# Patient Record
(demographics unavailable — no encounter records)

---

## 2024-10-09 NOTE — DISCUSSION/SUMMARY
[FreeTextEntry1] : 8 mth with uri saline nose drops and suction, saline nebs follow up if symptoms persist or worsen cerumen removal via curette

## 2024-10-09 NOTE — HISTORY OF PRESENT ILLNESS
[de-identified] : cough [FreeTextEntry6] : cough and rhinorrhea 4-5 days ago, cough 2 days eating and drinking

## 2024-10-31 NOTE — DISCUSSION/SUMMARY
[] : The components of the vaccine(s) to be administered today are listed in the plan of care. The disease(s) for which the vaccine(s) are intended to prevent and the risks have been discussed with the caretaker.  The risks are also included in the appropriate vaccination information statements which have been provided to the patient's caregiver.  The caregiver has given consent to vaccinate. [FreeTextEntry1] : 9 mth well, patricia hypothyroidism, increasing weight, nml development Hep b #3 flu #2 labs when we do thyroid labs sleep discussed to urology for foreskin issues discussed decreasing bottles and increasing meals Increase table foods, 3 meals with 2-3 snacks per day. Incorporate up to 6 oz of fluorinated water daily in a Sippy cup or cup with a straw. Wipe teeth daily with washcloth. When in car, patient should be in rear-facing car seat in back seat. Put baby to sleep in own crib with no loose or soft bedding. Lower crib mattress. Help baby to maintain consistent daily routines and sleep schedule. Recognize stranger anxiety. Ensure home is safe since baby is increasingly mobile. Be within arm's reach of baby at all times. Use consistent, positive discipline. Avoid screen time. Read aloud to baby.

## 2024-10-31 NOTE — HISTORY OF PRESENT ILLNESS
[Mother] : mother [Fruit] : fruit [Vegetables] : vegetables [Meat] : meat [Eggs] : eggs [Fish] : no fish [Peanut] : peanut [Dairy] : dairy [Normal] : Normal [In Crib] : sleeps in crib [Wakes up at night] : wakes up at night [Loose bedding, pillow, toys, and/or bumpers in crib] : no loose bedding, pillow, toys, and/or bumpers in crib [Brushing teeth] : not brushing teeth [de-identified] : 5 bottles/day

## 2024-10-31 NOTE — DEVELOPMENTAL MILESTONES
[Normal Development] : Normal Development [None] : none [Uses basic gestures] : uses basic gestures [Says "Dawson" or "Mama"] : says "Dawson" or "Mama" nonspecifically [Crawls] : crawls [Picks up small objects with 3 fingers] : picks up small objects with 3 fingers and thumb [Yes] : Completed.

## 2024-10-31 NOTE — PHYSICAL EXAM

## 2024-11-19 NOTE — DISCUSSION/SUMMARY
[FreeTextEntry1] : 9 mth with RSV bronchiolitis, rhino/enterovirus cool mist humidifier saline via nebulizer left serous OM, observe, follow up if worsens

## 2024-11-19 NOTE — PHYSICAL EXAM
[Clear] : right tympanic membrane clear [Clear Effusion] : clear effusion [Mucoid Discharge] : mucoid discharge [NL] : warm, clear

## 2024-11-19 NOTE — HISTORY OF PRESENT ILLNESS
[de-identified] : rsv check [FreeTextEntry6] : cough 6 days, nasal congestion resolved fever s/p 101 poor appetite, drinking not using albuterol

## 2024-12-03 NOTE — HISTORY OF PRESENT ILLNESS
[de-identified] : diarrhea [FreeTextEntry6] : diarrhea multiple episodes of watery stool 5 days ago, now has improved, no vomiting or fever feeding well, 3 bottles, 3 meals

## 2024-12-03 NOTE — DISCUSSION/SUMMARY
[FreeTextEntry1] : 10 mth with hypothyroid, gaining weight improving diarrhea, discussed diet labs drawn follow up if symptoms persist or worsen

## 2024-12-17 NOTE — DISCUSSION/SUMMARY
[FreeTextEntry1] : 10 mth with hypothyroidism, anemia, s/p synthroid increase, appears well labs drawn

## 2025-01-03 NOTE — HISTORY OF PRESENT ILLNESS
[FreeTextEntry6] : vomited a few times over last few days  liquid stool  has been eating  no fever  looks well

## 2025-01-07 NOTE — HISTORY OF PRESENT ILLNESS
[de-identified] : cough [FreeTextEntry6] : cough and rhinorrhea few days  no fever  resolved vomiting few days ago, diarrhea yesterday

## 2025-01-07 NOTE — DISCUSSION/SUMMARY
[FreeTextEntry1] : 11 mth with mild uri, gastroenteritis, weight loss  change to soy formula, BRAT diet, refrain from dairy saline nose drops thyroid and iron labs drawn re-check weight at 1 year check up counseling for travel

## 2025-01-18 NOTE — CONSULT LETTER
[FreeTextEntry1] : Dear Dr. KELLEN QIU,      I had the pleasure of consulting on PERFECTO HUITRON today.  Below is my note regarding the office visit today.      Thank you so very much for allowing me to participate in PERFECTO's care.  Please don't hesitate to call me should any questions or issues arise.        Sincerely,     Jeremias Romero MD, FACS, [facility]    Chief, Pediatric Urology    Professor of Urology and Pediatrics    [employer]        President, American U[employer] Association - [place] Section    Past-President, Societies for Pediatric Urology

## 2025-01-18 NOTE — CONSULT LETTER
[FreeTextEntry1] : Dear Dr. KELLEN QIU,      I had the pleasure of consulting on PERFECTO HUITRON today.  Below is my note regarding the office visit today.      Thank you so very much for allowing me to participate in PERFECTO's care.  Please don't hesitate to call me should any questions or issues arise.        Sincerely,     Jeremias Romero MD, FACS, [facility]    Chief, Pediatric Urology    Professor of Urology and Pediatrics    [employer] [facility] [employer]        President, American U[employer] Association - [place] Section    Past-President, Societies for Pediatric Urology

## 2025-01-18 NOTE — PHYSICAL EXAM
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 Meatus orthotopic. Penile adhesions   SCROTUM: Bilaterally symmetric testes in dependent position without palpable mass, hernia or hydrocele.

## 2025-01-18 NOTE — ASSESSMENT
[FreeTextEntry1] : PERFECTO has adhesions that were able to be lysed easily in the office. Parent was instructed to keep the area lubricated to avert recurrence. All questions were answered.  PERFECTO also has a hidden penis. The primary cause of this appearance in this case is the presence of large suprapubic fat. The fat pushes the skin forward and makes the penis look recessed. The loss of this fat with time will allow the skin to recess to the normal position and to make the penis not appear hidden. I recommended no surgical intervention at this time and to allow time for the fat to recede. If there is substantial fat loss, and the penis appears hidden, another consultation was recommended.

## 2025-01-18 NOTE — PROCEDURE
[FreeTextEntry1] : Lysis of adhesions performed   Adhesions completely released and Bacitracin applied   No bleeding and well tolerated   Checked again for bleeding before family left   Discharge instructions were provided  Parent confirmed that all questions were answered and understood

## 2025-01-18 NOTE — HISTORY OF PRESENT ILLNESS
[TextBox_4] : PERFECTO is here for evaluation today. He was born at term after an unassisted conception and (was/was not) circumcised in the nursery. The family reports concern for redundant foreskin. The penis has not changed in its configuration since the parents first noticed this. No urinary tract or penile redness or infections. He makes ample wet diapers without hematuria. No family history of penile abnormalities.  Of note, followed by endocrinology for congenital hypothyroidism.

## 2025-01-18 NOTE — CONSULT LETTER
[FreeTextEntry1] : Dear Dr. KELLEN QIU,      I had the pleasure of consulting on PERFECTO HUITRON today.  Below is my note regarding the office visit today.      Thank you so very much for allowing me to participate in PERFECTO's care.  Please don't hesitate to call me should any questions or issues arise.        Sincerely,     Jeremias Romero MD, FACS, [facility]    Chief, Pediatric Urology    Professor of Urology and Pediatrics    [employer] [facility] [employer]        President, American U[employer] Association - [place] Section    Past-President, Societies for Pediatric Urology

## 2025-02-19 NOTE — ASSESSMENT
[FreeTextEntry1] : Cindy is a 67-rzwra-trz with congenital hypothyroidism who presents for follow-up.   We reviewed recent labs at length with family. Labs reveal a persistent elevated TSH with Free T4 and Total T4 in the normal ranges. We discussed that clinically patient looks excellent with acceptable interval growth, and good progression with milestones. Clinically he appears euthryoid. Due to persistently elevated TSH, we will increase his dose to 88mcg daily. We discussed symptoms of overactive thyroid, although we believe patient will grow into this dose well.  Will obtain interval TFTs in 6-8 weeks.    Will also continue to track linear growth closely.  Will further workup if he decelerates further below 1%.   If otherwise clinically well, will likely defer initial evaluation of stature until 18 to 24 months.  FU in person in about 3 months.   Today's visit included obtaining the history from the parent, due to the child's age, the child could not be considered a reliable historian, requiring the parent to act as an independent historian. The condition, natural history, and prognosis were explained to the family. The clinical findings were reviewed with the family. All questions answered. Family expressed understanding and agreement with the above.  Marybeth Juarez PA-C Pediatric Endocrinology

## 2025-02-19 NOTE — HISTORY OF PRESENT ILLNESS
[FreeTextEntry2] : Cindy is a 36-rqghk-ybi with congenital hypothyroidism who presents for followup.  His NBS showed his TSH was 346.6 uIU/mL. He was born at 38.4 weeks gestation planned induction for IUGR, via normal spontaneous vaginal delivery at [facility]. Birth measurements were weight of 6.2 and length of 18.5. He was not SGA. Discharge weight was 6.0. FH: MGM and MGGM - Hashimoto's and graves. At initial visit at 5 days old, Cindy was started on alternating 37.5/25 mcg levothyroxine.  Labs 2-week later revealed a significant downtrend from 322 IUs/mL of 1.38 mcg/mL.  Dose was adjusted to 25 mcg daily.   Follow-up TFTs in March and April 2024 were normal at 4.9 and 4.1 IUs/mL.  25 mcg levothyroxine was continued. In June 2024, TSH was elevated to 18 IUs/mL setting of normal free T4 1.4/dL and total T4 of 10 mcg/dL.  Levothyroxine dose was increased from 25 to 37.  TFTs in august with TSH noted to be mildly improved but still elevated at 10 mcg/mL.  Levothyroxine was increased to 44 mcg. TSH elevated again in December 2024, increased dose to 56mcg.  In January 2025, TSH high at 25, and dose was increased to 68.5mcg (1/2 137mcg tablet. Also at that time, mother reported patient was taking iron supplement right after the thyroid medication, and we discussed that this could be inhibiting absorption, and to space out the medication.   Since last visit, Cindy has been well.  He continues on 68.5 mcg (1/2 the 137mcg) levothyroxine daily. r  Review of TFTs in preperation of visit today reveals further elevated TSH to 32 UIU/ml and normal T4 and free T4.  Family denies any missed doses recently. Mother admits that she discontinued the iron supplement altogether for the past month. He is not taking any other medications.  He is progressing through milestones well, with recently independently walking for the past 1 month. Mother notes good muscle tone. He is interactive and playful.  No recent illnesses, last had RSV in October 2024. Cindy continues to gain his weight and he remains around the 1st percentile for height.  Mom attributes this to significant short stature in both mom and dad side of the family.  Moms height 60 inches Dads height 65.  Inches

## 2025-02-19 NOTE — ASSESSMENT
[FreeTextEntry1] : Cindy is a 81-ndyad-xyb with congenital hypothyroidism who presents for follow-up.   We reviewed recent labs at length with family. Labs reveal a persistent elevated TSH with Free T4 and Total T4 in the normal ranges. We discussed that clinically patient looks excellent with acceptable interval growth, and good progression with milestones. Clinically he appears euthryoid. Due to persistently elevated TSH, we will increase his dose to 88mcg daily. We discussed symptoms of overactive thyroid, although we believe patient will grow into this dose well.  Will obtain interval TFTs in 6-8 weeks.    Will also continue to track linear growth closely.  Will further workup if he decelerates further below 1%.   If otherwise clinically well, will likely defer initial evaluation of stature until 18 to 24 months.  FU in person in about 3 months.   Today's visit included obtaining the history from the parent, due to the child's age, the child could not be considered a reliable historian, requiring the parent to act as an independent historian. The condition, natural history, and prognosis were explained to the family. The clinical findings were reviewed with the family. All questions answered. Family expressed understanding and agreement with the above.  Marybeth Juarez PA-C Pediatric Endocrinology

## 2025-02-19 NOTE — CONSULT LETTER
[Dear  ___] : Dear  [unfilled], [Consult Letter:] : I had the pleasure of evaluating your patient, [unfilled]. [Please see my note below.] : Please see my note below. [Consult Closing:] : Thank you very much for allowing me to participate in the care of this patient.  If you have any questions, please do not hesitate to contact me. [Sincerely,] : Sincerely, [FreeTextEntry3] : Christine Hughes MD  [facility] Physician [facility] Division of Pediatric Endocrinology P: [phone] F: [phone]

## 2025-02-19 NOTE — HISTORY OF PRESENT ILLNESS
[FreeTextEntry2] : Cindy is a 36-qlqpo-kvi with congenital hypothyroidism who presents for followup.  His NBS showed his TSH was 346.6 uIU/mL. He was born at 38.4 weeks gestation planned induction for IUGR, via normal spontaneous vaginal delivery at [facility]. Birth measurements were weight of 6.2 and length of 18.5. He was not SGA. Discharge weight was 6.0. FH: MGM and MGGM - Hashimoto's and graves. At initial visit at 5 days old, Cindy was started on alternating 37.5/25 mcg levothyroxine.  Labs 2-week later revealed a significant downtrend from 322 IUs/mL of 1.38 mcg/mL.  Dose was adjusted to 25 mcg daily.   Follow-up TFTs in March and April 2024 were normal at 4.9 and 4.1 IUs/mL.  25 mcg levothyroxine was continued. In June 2024, TSH was elevated to 18 IUs/mL setting of normal free T4 1.4/dL and total T4 of 10 mcg/dL.  Levothyroxine dose was increased from 25 to 37.  TFTs in august with TSH noted to be mildly improved but still elevated at 10 mcg/mL.  Levothyroxine was increased to 44 mcg. TSH elevated again in December 2024, increased dose to 56mcg.  In January 2025, TSH high at 25, and dose was increased to 68.5mcg (1/2 137mcg tablet. Also at that time, mother reported patient was taking iron supplement right after the thyroid medication, and we discussed that this could be inhibiting absorption, and to space out the medication.   Since last visit, Cindy has been well.  He continues on 68.5 mcg (1/2 the 137mcg) levothyroxine daily. r  Review of TFTs in preperation of visit today reveals further elevated TSH to 32 UIU/ml and normal T4 and free T4.  Family denies any missed doses recently. Mother admits that she discontinued the iron supplement altogether for the past month. He is not taking any other medications.  He is progressing through milestones well, with recently independently walking for the past 1 month. Mother notes good muscle tone. He is interactive and playful.  No recent illnesses, last had RSV in October 2024. Cindy continues to gain his weight and he remains around the 1st percentile for height.  Mom attributes this to significant short stature in both mom and dad side of the family.  Moms height 60 inches Dads height 65.  Inches   Cane

## 2025-02-19 NOTE — CONSULT LETTER
[Dear  ___] : Dear  [unfilled], [Consult Letter:] : I had the pleasure of evaluating your patient, [unfilled]. [Please see my note below.] : Please see my note below. [Consult Closing:] : Thank you very much for allowing me to participate in the care of this patient.  If you have any questions, please do not hesitate to contact me. [Sincerely,] : Sincerely, [FreeTextEntry3] : Christine Hughes MD  [facility] Physician [facility] Division of Pediatric Endocrinology P: [phone] F: [phone]

## 2025-04-01 NOTE — HISTORY OF PRESENT ILLNESS
[de-identified] : fever [FreeTextEntry6] : fever almost 3 days tmax 102  rhinorrhea, cough  poor sleep, eating today and drinking

## 2025-04-01 NOTE — DISCUSSION/SUMMARY
[FreeTextEntry1] : 14 mth with fever 3 days, uri, pharyngitis  previous rapid flu neg declined respiratory panel/covid testing  fluids  tylenol/motrin parameters follow up if symptoms worsen or persist more than 3 days

## 2025-05-27 NOTE — HISTORY OF PRESENT ILLNESS
[Mother] : mother [Cow's milk (Ounces per day ___)] : consumes [unfilled] oz of cow's milk per day [Fruit] : fruit [Vegetables] : vegetables [Meat] : meat [Eggs] : eggs [Normal] : Normal [de-identified] : picky with vegetables

## 2025-05-27 NOTE — PHYSICAL EXAM
[Alert] : alert [No Acute Distress] : no acute distress [Normocephalic] : normocephalic [Anterior Franklin Closed] : anterior fontanelle closed [Red Reflex Bilateral] : red reflex bilateral [PERRL] : PERRL [Normally Placed Ears] : normally placed ears [Auricles Well Formed] : auricles well formed [Clear Tympanic membranes with present light reflex and bony landmarks] : clear tympanic membranes with present light reflex and bony landmarks [No Discharge] : no discharge [Nares Patent] : nares patent [Palate Intact] : palate intact [Uvula Midline] : uvula midline [Tooth Eruption] : tooth eruption  [Supple, full passive range of motion] : supple, full passive range of motion [No Palpable Masses] : no palpable masses [Symmetric Chest Rise] : symmetric chest rise [Clear to Auscultation Bilaterally] : clear to auscultation bilaterally [Regular Rate and Rhythm] : regular rate and rhythm [S1, S2 present] : S1, S2 present [No Murmurs] : no murmurs [+2 Femoral Pulses] : +2 femoral pulses [Soft] : soft [NonTender] : non tender [Non Distended] : non distended [Normoactive Bowel Sounds] : normoactive bowel sounds [No Hepatomegaly] : no hepatomegaly [No Splenomegaly] : no splenomegaly [Fransisco 1] : Fransisco 1 [Central Urethral Opening] : central urethral opening [Testicles Descended Bilaterally] : testicles descended bilaterally [Patent] : patent [Normally Placed] : normally placed [No Abnormal Lymph Nodes Palpated] : no abnormal lymph nodes palpated [No Clavicular Crepitus] : no clavicular crepitus [Negative Jones-Ortalani] : negative Jones-Ortalani [Symmetric Buttocks Creases] : symmetric buttocks creases [No Spinal Dimple] : no spinal dimple [NoTuft of Hair] : no tuft of hair [Cranial Nerves Grossly Intact] : cranial nerves grossly intact [No Rash or Lesions] : no rash or lesions [FreeTextEntry6] : redundant foreskin and white area on right side of penis

## 2025-05-27 NOTE — DISCUSSION/SUMMARY
[] : The components of the vaccine(s) to be administered today are listed in the plan of care. The disease(s) for which the vaccine(s) are intended to prevent and the risks have been discussed with the caretaker.  The risks are also included in the appropriate vaccination information statements which have been provided to the patient's caregiver.  The caregiver has given consent to vaccinate. [FreeTextEntry1] : 16 mth well, congenital hypothyroidism  resolved anemia redundant foreskin, following wth urology fine motor delays, discussed appropriate educational toys to encourage development biting behavior, discussed techniques on handling behavior pentacel prevnar picky eater, discussed.Continue table foods, 3 meals with 2-3 snacks per day. Incorporate fluorinated water daily. Brush teeth twice a day with soft toothbrush. Recommend visit to dentist. When in car, keep child in rear-facing car seats until age 2, or until  the maximum height and weight for seat is reached. Put baby to sleep in own crib. Lower crib mattress. Help baby to maintain consistent daily routines and sleep schedule. Recognize stranger and separation anxiety. Ensure home is safe since baby is increasingly mobile. Be within arm's reach of baby at all times. Use consistent, positive discipline. Read aloud to baby.  Return in 3 mo for 18 mo well child check.

## 2025-05-27 NOTE — DEVELOPMENTAL MILESTONES
[Drinks from cup with little] : drinks from cup with little spilling [Uses 3 words other than names] : uses 3 words other than names [Speaks in sounds that seem like] : speaks in sounds that seem like an unknown language [Begins to run] : begins to run [FreeTextEntry1] : uses utensils sometimes, prefers hands [Yes] : Completed.

## 2025-05-30 NOTE — CONSULT LETTER
[Dear  ___] : Dear  [unfilled], [Consult Letter:] : I had the pleasure of evaluating your patient, [unfilled]. [Please see my note below.] : Please see my note below. [Consult Closing:] : Thank you very much for allowing me to participate in the care of this patient.  If you have any questions, please do not hesitate to contact me. [Sincerely,] : Sincerely, [FreeTextEntry3] : Christine Hughes MD  [facility] Physician [facility] Division of Pediatric Endocrinology P: [phone] F: [phone]

## 2025-05-30 NOTE — HISTORY OF PRESENT ILLNESS
[FreeTextEntry2] : Cindy is a 12-csvuw-cjl with congenital hypothyroidism and poor linear growth who presents for followup.  His NBS showed his TSH was 346.6 uIU/mL. He was born at 38.4 weeks gestation planned induction for IUGR, via normal spontaneous vaginal delivery at [facility]. Birth measurements were weight of 6.2 and length of 18.5. He was not SGA. Discharge weight was 6.0. FH: MGM and MGGM - Hashimoto's and graves. At initial visit at 5 days old, Cindy was started on alternating 37.5/25 mcg levothyroxine.  Dose of levothyroxine has been uptitrated over the past year to most recently 88 mcg.  TSH declined from 32 IUs in February 20 25 to 0.64 IUs in March 2025.  TSH suppression persisted to 0.10 IUs/mL in May 2025 and dose was lowered to 75 mcg.  He had been taking iron which I was worried was interfering with levothyroxine absorption.  Since last visit, they have stopped iron.  They are also dissolving levothyroxine milk on some days.  Linear growth continues in the 1st percentile but is much closer to the bottom of the curve, consistent with genetic potential.  On review of systems, he is well.  No developmental concerns. Moms height 60 inches Dads height 65.  Inches

## 2025-05-30 NOTE — ASSESSMENT
[FreeTextEntry1] : Cindy is a 23-gzyuj-lao with congenital hypothyroidism and poor linear growth who presents for followup.   I am pleased with Mariela linear growth progress.  In the past, he was far from the 1st percentile but now it seems linear growth has progressed to the 1st percentile itself which is appropriate for family genetics.  Will continue to follow.  Will continue 75 mcg levothyroxine.  I will obtain TSH, free T4, total T4 in 2 months.  Will see back in person or video 2 months after this in 4 months from today.